# Patient Record
Sex: MALE | Race: WHITE | URBAN - METROPOLITAN AREA
[De-identification: names, ages, dates, MRNs, and addresses within clinical notes are randomized per-mention and may not be internally consistent; named-entity substitution may affect disease eponyms.]

---

## 2022-10-02 NOTE — PROGRESS NOTES
Patient Name:  Melia Jimenez  Patient :  1960  Patient MRN:  [id number]     Primary Oncologist: Fran Serna MD  Referring Provider: Sepideh Bright MD     Date of Service: 10/4/2022      Reason for Consult:  skin cancer      Chief Complaint:    Chief Complaint   Patient presents with    New Patient        There is no problem list on file for this patient. HPI:   Melia Jimenez is a pleasant 60year old male patient who was referred for evaluation of more than 10 skin lesions suspicious for basal cell carcinoma. He also has suspicious skin lesion to his left cheek. Reportedly he had a biopsy from from the left cheek 3 years ago which was negative. In his young age he was using some tanning jerome a lot. I discussed with him about the role of systemic therapy for the basal cell and also potential genetic study to rule out Gorlin syndrome. He is not interested in radiation therapy. He wants me to refer him to see surgical oncologist for melanoma and skin cancer at [facility]. He never had colonoscopy and refused to have colonoscopy. Past Medical History: Allergies, atrial fibrillation, COPD, hypertension, obesity, basal cell carcinoma. Surgery History:    Knee surgery, prostate biopsy in , benign. Cystoscopy. Social History:   He denied any history of smoking. No alcohol abuse. He does not have any children. Family History:   Paternal uncle had pancreatic cancer. Allergies  CAT scan dye. Current Outpatient Medications on File Prior to Visit   Medication Sig Dispense Refill    rivaroxaban (XARELTO) 10 MG TABS tablet Take 10 mg by mouth daily      lisinopril (PRINIVIL;ZESTRIL) 10 MG tablet Take 10 mg by mouth daily      digoxin (LANOXIN) 125 MCG tablet Take 0.125 mg by mouth daily      testosterone cypionate (DEPOTESTOTERONE CYPIONATE) 200 MG/ML injection Inject 200 mg into the muscle. No current facility-administered medications on file prior to visit. Review of Systems:    Constitutional:  No weight loss, No fever, No chills, No night sweats. Energy level good. Eyes:  No diplopia, No transient or permanent loss of vision, No scotomata. ENT / Mouth:  No epistaxis, No dysphagia, No hoarseness, No oral ulcers, No gingival bleeding. No sore throat, No postnasal drip, No nasal drip, No mouth pain, No sinus pain, No tinnitus, Normal hearing. Cardiovascular:  No chest pain, No palpitations, No syncope, No upper extremity edema, No lower extremity edema, No calf discomfort. Respiratory:  No cough. No hemoptysis, No pleurisy, No wheezing, No dyspnea. Gastrointestinal:  No abdominal pain, No abdominal cramping, No nausea, No vomiting, No constipation, No diarrhea, No hematochezia, No melena, No jaundice, No dyspepsia, No dysphagia. Urinary:  No dysuria, No hematuria, No urinary incontinence. Musculoskeletal:  No muscle pain, No swollen joints, No joint redness, No bone pain, No spine tenderness. Skin:  No rash, No nodules, No pruritus, No lesions. Neurologic:  No confusion, No seizures, No syncope, No tremor, No speech change, No headache, No hiccups, No abnormal gait, No sensory changes, No weakness. Psychiatric:  No depression, No anxiety, Concentration normal.  Endocrine:  No polyuria, No polydipsia, No hot flashes, No thyroid symptoms. Hematologic:  No epistaxis, No gingival bleeding, No petechiae, No ecchymosis. Lymphatic:  No lymphadenopathy, No lymphedema. Allergy / Immunologic:  No eczema, No frequent mucous infections, No frequent respiratory infections, No recurrent urticarial, No frequent skin infections.      Vital Signs: BP (!) 144/93 (Site: Right Upper Arm, Position: Sitting, Cuff Size: Large Adult)   Pulse 85   Temp 97 °F (36.1 °C) (Infrared)   Resp 16   Ht 6' 1\" (1.854 m)   Wt (!) 314 lb (142.4 kg)   SpO2 98%   BMI 41.43 kg/m²      Physical Exam:  CONSTITUTIONAL: awake, alert, cooperative, no apparent distress   EYES: pupils equal, round and reactive to light. Sclera clear and conjunctiva normal  ENT: Normocephalic, without obvious abnormality, atraumatic  NECK: supple, symmetrical, no jugular venous distension and no carotid bruits   HEMATOLOGIC/LYMPHATIC: no cervical, supraclavicular or axillary lymphadenopathy   LUNGS: no increased work of breathing and clear to auscultation   CARDIOVASCULAR: regular rate and rhythm, normal S1 and S2, no murmur noted  ABDOMEN: normal bowel sound, soft, non-distended, non-tender, no masses palpated, no hepatosplenomegaly   MUSCULOSKELETAL: full range of motion noted, tone is normal  NEUROLOGIC: awake, alert, oriented to name, place and time. Motor skills grossly intact. SKIN: No jaundice. He has multiple basal cell like lesion to his chest and upper back. He has pigmented lesion to the left cheek. EXTREMITIES: no LE edema      Labs:  Hematology:  No results found for: WBC, RBC, HGB, HCT, MCV, MCH, MCHC, RDW, PLT, MPV, BANDSPCT, SEGSPCT, EOSRELPCT, BASOPCT, LYMPHOPCT, MONOPCT, BANDABS, SEGSABS, EOSABS, BASOSABS, LYMPHSABS, MONOSABS, DIFFTYPE, ANISOCYTOSIS, POLYCHROM, WBCMORP, PLTM  No results found for: ESR    Chemistry:  No results found for: NA, K, CL, CO2, BUN, CREATININE, GLUCOSE, CALCIUM, PROT, LABALBU, BILITOT, ALKPHOS, AST, ALT, LABGLOM, GFRAA, AGRATIO, GLOB, PHOS, MG, POCCA, POCGLU  No results found for: MMA, LDH, HOMOCYSTEINE  No components found for: LD  No results found for: TSHHS, T4FREE, FT3    Immunology:  No results found for: PROT, SPEP, ALBUMINELP, LABALPH, LABBETA, GAMGLOB  No results found for: Senora Sleight, LAMBDAUVOL, KLFLCR  No results found for: B2M    Coagulation Panel:  No results found for: PROTIME, INR, APTT, DDIMER    Anemia Panel:  No results found for: [id number], FOLATE    Observations:  PHQ-9 Total Score: 7 (10/4/2022  8:37 AM)  Thoughts that you would be better off dead, or of hurting yourself in some way: 0 (10/4/2022  8:37 AM)     Assessment & Plan:  1.   He was referred for 10+ BSC-like lesion to his trunk. We discussed about potential systemic therapy with hedgehog pathway inhibitor. We discussed about potential side effects of the heart Offerle pathway inhibitor. He is not interested in radiation therapy. He prefers surgical procedure but will discuss first with the surgeon. 2.  He requested a referral to see surgical oncologist at the [facility] to discuss about the lesion to his left cheek and basal cell. 3.  Discussed about potential genetic study to rule out Gorlin syndrome. Likely his skin cancers related to previous use of the same tanning jerome. 4.  CBC and CMP in September 2022 were grossly unremarkable. 5.  He is not interested in colon cancer screening. We discussed about diet and weight loss. Return to clinic in 3 to 4 weeks or sooner. All of his questions been answered for today. I have discussed the above stated plan with the patient and they verbalized understanding and agreed with the plan. Thank you for allowing us to participate in this patient's care.

## 2022-10-04 ENCOUNTER — INITIAL CONSULT (OUTPATIENT)
Dept: ONCOLOGY | Age: 62
End: 2022-10-04
Payer: COMMERCIAL

## 2022-10-04 ENCOUNTER — HOSPITAL ENCOUNTER (OUTPATIENT)
Dept: INFUSION THERAPY | Age: 62
Discharge: HOME | End: 2022-10-04
Payer: COMMERCIAL

## 2022-10-04 VITALS
OXYGEN SATURATION: 98 % | WEIGHT: 314 LBS | HEART RATE: 85 BPM | DIASTOLIC BLOOD PRESSURE: 93 MMHG | TEMPERATURE: 97 F | RESPIRATION RATE: 16 BRPM | SYSTOLIC BLOOD PRESSURE: 144 MMHG | HEIGHT: 73 IN | BODY MASS INDEX: 41.62 KG/M2

## 2022-10-04 DIAGNOSIS — C44.91 BASAL CELL CARCINOMA (BCC), UNSPECIFIED SITE: Primary | ICD-10-CM

## 2022-10-04 PROCEDURE — G8484 FLU IMMUNIZE NO ADMIN: HCPCS | Performed by: INTERNAL MEDICINE

## 2022-10-04 PROCEDURE — G8427 DOCREV CUR MEDS BY ELIG CLIN: HCPCS | Performed by: INTERNAL MEDICINE

## 2022-10-04 PROCEDURE — 1036F TOBACCO NON-USER: CPT | Performed by: INTERNAL MEDICINE

## 2022-10-04 PROCEDURE — 3017F COLORECTAL CA SCREEN DOC REV: CPT | Performed by: INTERNAL MEDICINE

## 2022-10-04 PROCEDURE — G8417 CALC BMI ABV UP PARAM F/U: HCPCS | Performed by: INTERNAL MEDICINE

## 2022-10-04 PROCEDURE — 99205 OFFICE O/P NEW HI 60 MIN: CPT | Performed by: INTERNAL MEDICINE

## 2022-10-04 PROCEDURE — 99211 OFF/OP EST MAY X REQ PHY/QHP: CPT

## 2022-10-04 RX ORDER — LISINOPRIL 10 MG/1
10 TABLET ORAL DAILY
COMMUNITY

## 2022-10-04 RX ORDER — TESTOSTERONE CYPIONATE 200 MG/ML
200 INJECTION INTRAMUSCULAR
COMMUNITY
Start: 2022-09-21

## 2022-10-04 RX ORDER — DIGOXIN 125 MCG
0.12 TABLET ORAL DAILY
COMMUNITY
Start: 2019-05-21

## 2022-10-04 ASSESSMENT — PATIENT HEALTH QUESTIONNAIRE - PHQ9
1. LITTLE INTEREST OR PLEASURE IN DOING THINGS: 1
8. MOVING OR SPEAKING SO SLOWLY THAT OTHER PEOPLE COULD HAVE NOTICED. OR THE OPPOSITE, BEING SO FIGETY OR RESTLESS THAT YOU HAVE BEEN MOVING AROUND A LOT MORE THAN USUAL: 0
10. IF YOU CHECKED OFF ANY PROBLEMS, HOW DIFFICULT HAVE THESE PROBLEMS MADE IT FOR YOU TO DO YOUR WORK, TAKE CARE OF THINGS AT HOME, OR GET ALONG WITH OTHER PEOPLE: 0
SUM OF ALL RESPONSES TO PHQ QUESTIONS 1-9: 7
4. FEELING TIRED OR HAVING LITTLE ENERGY: 2
SUM OF ALL RESPONSES TO PHQ QUESTIONS 1-9: 7
3. TROUBLE FALLING OR STAYING ASLEEP: 2
7. TROUBLE CONCENTRATING ON THINGS, SUCH AS READING THE NEWSPAPER OR WATCHING TELEVISION: 0
9. THOUGHTS THAT YOU WOULD BE BETTER OFF DEAD, OR OF HURTING YOURSELF: 0
6. FEELING BAD ABOUT YOURSELF - OR THAT YOU ARE A FAILURE OR HAVE LET YOURSELF OR YOUR FAMILY DOWN: 0
SUM OF ALL RESPONSES TO PHQ QUESTIONS 1-9: 7
SUM OF ALL RESPONSES TO PHQ9 QUESTIONS 1 & 2: 2
5. POOR APPETITE OR OVEREATING: 1
2. FEELING DOWN, DEPRESSED OR HOPELESS: 1
SUM OF ALL RESPONSES TO PHQ QUESTIONS 1-9: 7

## 2022-10-04 NOTE — PROGRESS NOTES
MA Rooming Questions  Patient: Cheri Dominguez  MRN: [id number]    Date: 10/4/2022        New patient                 Cal Uriostegui, [address] [place], [place] [address]

## 2022-11-17 ENCOUNTER — TELEPHONE (OUTPATIENT)
Dept: ONCOLOGY | Age: 62
End: 2022-11-17

## 2022-11-17 NOTE — TELEPHONE ENCOUNTER
On 11/17 I called and spoke with Natalia Rubalcava from [facility] and she states that patient was seen in office 11/16 and had 4 biopsies done. At the present time there are no results back yet she noted patients chart to fax results over to us as soon as they are received informed her patient has a return visit to see us Monday 11/21 and she states results should be back before that visit.

## 2022-11-23 NOTE — PROGRESS NOTES
Patient Name:  Melia Jimenez  Patient :  1960  Patient MRN:  [id number]     Primary Oncologist: Fran Serna MD  Referring Provider: Sepideh Bright MD     Date of Service: 2022       Chief Complaint:    Chief Complaint   Patient presents with    Follow-up       He came in for follow up visit. There is no problem list on file for this patient. HPI:   Melia Jimenez is a pleasant 60year old male patient who was referred for evaluation of more than 10 skin lesions suspicious for basal cell carcinoma. He also has suspicious skin lesion to his left cheek. Reportedly he had a biopsy from from the left cheek 3 years ago which was negative. In his young age he was using some tanning jerome a lot. I discussed with him about the role of systemic therapy for the basal cell and also potential genetic study to rule out Gorlin syndrome. He is not interested in radiation therapy. He wants me to refer him to see surgical oncologist for melanoma and skin cancer at [facility]. He never had colonoscopy and refused to have colonoscopy. 2022 he came in for follow up visit. He has shave biopsy of the skin lesions on 2022. Left central medial malar cheek: malignant melanoma in situ, lentigo maligna type. Left central lateral malar cheek: malignant melanoma in situ, lentigo maligna type  Right medial superior chest: BSC, nodular type, ulcerated. Left medial superior chest: Squamous cell ca, moderately differentiated. He was referred to surgeon for wide excision of melanoma in situ from the left cheek. I recommend to discuss with surgeon also about excision of the [facility] and squamous cell ca from the right and left superior chest respectively. He will come back to see me after the surgery. No acute pain. Denied any nausea, vomiting or diarrhea. No fever or chills. No chest pain, shortness of breath or palpitation. No headache or dizzy spell. No specific bone pain.   No melena or hematochezia. Denied any dysuria or hematuria. Past Medical History: Allergies, atrial fibrillation, COPD, hypertension, obesity, basal cell carcinoma. Surgery History:    Knee surgery, prostate biopsy in 2022, benign. Cystoscopy. Social History:   He denied any history of smoking. No alcohol abuse. He does not have any children. Family History:   Paternal uncle had pancreatic cancer. Review of Systems: The remainder of ROS is unremarkable. Vital Signs: BP (!) 127/96 (Site: Right Upper Arm, Position: Sitting, Cuff Size: Large Adult)   Pulse (!) 111   Temp 98.1 °F (36.7 °C) (Temporal)   Resp 18   Ht 6' 1\" (1.854 m)   Wt (!) 320 lb (145.2 kg)   SpO2 96%   BMI 42.22 kg/m²      Physical Exam:  CONSTITUTIONAL: awake, alert, cooperative, no apparent distress   EYES: pupils equal, round and reactive to light. Sclera clear and conjunctiva normal  ENT: Normocephalic, without obvious abnormality, atraumatic  NECK: supple, symmetrical, no jugular venous distension and no carotid bruits   HEMATOLOGIC/LYMPHATIC: no cervical, supraclavicular or axillary lymphadenopathy   LUNGS: no increased work of breathing and clear to auscultation   CARDIOVASCULAR: regular rate and rhythm, normal S1 and S2, no murmur noted  ABDOMEN: normal bowel sound, soft, non-distended, non-tender, no masses palpated, no hepatosplenomegaly   MUSCULOSKELETAL: full range of motion noted, tone is normal  NEUROLOGIC: Motor skills grossly intact. CN II - XII grossly intact. SKIN: No jaundice. He has multiple basal cell like lesions to his chest and upper back. He has pigmented lesion to the left cheek.   EXTREMITIES: no LE edema      Labs:  Hematology:  No results found for: WBC, RBC, HGB, HCT, MCV, MCH, MCHC, RDW, PLT, MPV, BANDSPCT, SEGSPCT, EOSRELPCT, BASOPCT, LYMPHOPCT, MONOPCT, BANDABS, SEGSABS, EOSABS, BASOSABS, LYMPHSABS, MONOSABS, DIFFTYPE, ANISOCYTOSIS, POLYCHROM, WBCMORP, PLTM  No results found for: ESR    Chemistry:  No results found for: NA, K, CL, CO2, BUN, CREATININE, GLUCOSE, CALCIUM, PROT, LABALBU, BILITOT, ALKPHOS, AST, ALT, LABGLOM, GFRAA, AGRATIO, GLOB, PHOS, MG, POCCA, POCGLU  No results found for: MMA, LDH, HOMOCYSTEINE  No components found for: LD  No results found for: TSHHS, T4FREE, FT3    Immunology:  No results found for: PROT, SPEP, ALBUMINELP, LABALPH, LABBETA, GAMGLOB  No results found for: KAPPAUVOL, LAMBDAUVOL, KLFLCR  No results found for: B2M    Coagulation Panel:  No results found for: PROTIME, INR, APTT, DDIMER    Anemia Panel:  No results found for: [id number], FOLATE    Observations:  No data recorded     Assessment & Plan:  1. He was referred for 10+ BSC-like lesion to his trunk. We discussed about potential systemic therapy with hedgehog pathway inhibitor. We discussed about potential side effects of the hedgehog pathway inhibitor. He is not interested in radiation therapy. He prefers surgical procedure but will discuss first with the surgeon. 2.  He requested a referral to see surgical oncologist at the [facility] to discuss about the lesion to his left cheek and basal cell. He has biopsy of the skin lesion from left cheek, right and left superior chest in 11/2022. He is referred  to surgeon for wide excision of melanoma in situ from the left cheek and excision of the [facility] from the right superior chest and SCC from the left superior chest.    3.  Discussed about potential genetic study to rule out Gorlin syndrome. Likely his skin cancers related to previous use of the same tanning jerome. 4.  CBC and CMP in September 2022 were grossly unremarkable. 5.  He is not interested in colon cancer screening. We discussed about diet and weight loss. Return to clinic in 3 to 4 weeks or sooner. All of his questions been answered for today. I have discussed the above stated plan with the patient and they verbalized understanding and agreed with the plan.

## 2022-11-30 ENCOUNTER — OFFICE VISIT (OUTPATIENT)
Dept: ONCOLOGY | Age: 62
End: 2022-11-30
Payer: COMMERCIAL

## 2022-11-30 ENCOUNTER — HOSPITAL ENCOUNTER (OUTPATIENT)
Dept: INFUSION THERAPY | Age: 62
Discharge: HOME | End: 2022-11-30
Payer: COMMERCIAL

## 2022-11-30 VITALS
HEIGHT: 73 IN | DIASTOLIC BLOOD PRESSURE: 96 MMHG | OXYGEN SATURATION: 96 % | SYSTOLIC BLOOD PRESSURE: 127 MMHG | BODY MASS INDEX: 41.75 KG/M2 | HEART RATE: 111 BPM | WEIGHT: 315 LBS | RESPIRATION RATE: 18 BRPM | TEMPERATURE: 98.1 F

## 2022-11-30 DIAGNOSIS — C44.91 BASAL CELL CARCINOMA (BCC), UNSPECIFIED SITE: Primary | ICD-10-CM

## 2022-11-30 DIAGNOSIS — D03.30 MELANOMA IN SITU OF FACE (HCC): ICD-10-CM

## 2022-11-30 PROCEDURE — 3017F COLORECTAL CA SCREEN DOC REV: CPT | Performed by: INTERNAL MEDICINE

## 2022-11-30 PROCEDURE — 99214 OFFICE O/P EST MOD 30 MIN: CPT | Performed by: INTERNAL MEDICINE

## 2022-11-30 PROCEDURE — G8417 CALC BMI ABV UP PARAM F/U: HCPCS | Performed by: INTERNAL MEDICINE

## 2022-11-30 PROCEDURE — G8427 DOCREV CUR MEDS BY ELIG CLIN: HCPCS | Performed by: INTERNAL MEDICINE

## 2022-11-30 PROCEDURE — G8484 FLU IMMUNIZE NO ADMIN: HCPCS | Performed by: INTERNAL MEDICINE

## 2022-11-30 PROCEDURE — 99211 OFF/OP EST MAY X REQ PHY/QHP: CPT

## 2022-11-30 PROCEDURE — 1036F TOBACCO NON-USER: CPT | Performed by: INTERNAL MEDICINE

## 2022-11-30 RX ORDER — METHOCARBAMOL 500 MG/1
TABLET, FILM COATED ORAL
COMMUNITY
Start: 2022-09-24

## 2022-11-30 RX ORDER — TRAZODONE HYDROCHLORIDE 100 MG/1
TABLET ORAL
COMMUNITY
Start: 2022-11-08

## 2022-11-30 RX ORDER — LISINOPRIL 20 MG/1
TABLET ORAL
COMMUNITY
Start: 2022-11-28

## 2022-11-30 RX ORDER — FUROSEMIDE 20 MG/1
TABLET ORAL
COMMUNITY
Start: 2022-11-28

## 2022-11-30 RX ORDER — TRAMADOL HYDROCHLORIDE 50 MG/1
TABLET ORAL
COMMUNITY
Start: 2022-11-28

## 2022-11-30 RX ORDER — POTASSIUM CHLORIDE 1500 MG/1
TABLET, EXTENDED RELEASE ORAL
COMMUNITY
Start: 2022-11-22

## 2022-11-30 RX ORDER — OXYBUTYNIN CHLORIDE 5 MG/1
TABLET, EXTENDED RELEASE ORAL
COMMUNITY
Start: 2022-11-04

## 2022-11-30 RX ORDER — ALBUTEROL SULFATE 90 UG/1
AEROSOL, METERED RESPIRATORY (INHALATION)
COMMUNITY
Start: 2022-11-15

## 2022-11-30 RX ORDER — ESCITALOPRAM OXALATE 10 MG/1
TABLET ORAL
COMMUNITY
Start: 2022-11-08

## 2022-11-30 RX ORDER — METOPROLOL TARTRATE 50 MG/1
TABLET, FILM COATED ORAL
COMMUNITY
Start: 2022-10-27

## 2022-11-30 RX ORDER — HYDROCODONE BITARTRATE AND ACETAMINOPHEN 5; 325 MG/1; MG/1
TABLET ORAL
COMMUNITY
Start: 2022-11-28

## 2022-11-30 RX ORDER — TAMSULOSIN HYDROCHLORIDE 0.4 MG/1
CAPSULE ORAL
COMMUNITY
Start: 2022-10-20

## 2022-11-30 RX ORDER — DILTIAZEM HYDROCHLORIDE 240 MG/1
CAPSULE, COATED, EXTENDED RELEASE ORAL
COMMUNITY
Start: 2022-11-07